# Patient Record
Sex: MALE | Race: WHITE
[De-identification: names, ages, dates, MRNs, and addresses within clinical notes are randomized per-mention and may not be internally consistent; named-entity substitution may affect disease eponyms.]

---

## 2019-12-24 ENCOUNTER — HOSPITAL ENCOUNTER (EMERGENCY)
Dept: HOSPITAL 8 - ED | Age: 69
Discharge: HOME | End: 2019-12-24
Payer: MEDICARE

## 2019-12-24 ENCOUNTER — HOSPITAL ENCOUNTER (EMERGENCY)
Facility: MEDICAL CENTER | Age: 69
End: 2019-12-24

## 2019-12-24 VITALS
WEIGHT: 179.01 LBS | OXYGEN SATURATION: 100 % | HEIGHT: 67 IN | BODY MASS INDEX: 28.1 KG/M2 | HEART RATE: 68 BPM | RESPIRATION RATE: 16 BRPM | TEMPERATURE: 97.7 F | SYSTOLIC BLOOD PRESSURE: 156 MMHG | DIASTOLIC BLOOD PRESSURE: 86 MMHG

## 2019-12-24 VITALS — HEIGHT: 67 IN | BODY MASS INDEX: 27.34 KG/M2 | WEIGHT: 174.17 LBS

## 2019-12-24 VITALS — DIASTOLIC BLOOD PRESSURE: 86 MMHG | SYSTOLIC BLOOD PRESSURE: 157 MMHG

## 2019-12-24 DIAGNOSIS — Z72.89: ICD-10-CM
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
## 2019-12-24 DIAGNOSIS — Z86.73: ICD-10-CM

## 2019-12-24 DIAGNOSIS — S02.2XXB: Primary | ICD-10-CM

## 2019-12-24 DIAGNOSIS — S09.8XXA: ICD-10-CM

## 2019-12-24 PROCEDURE — 302449 STATCHG TRIAGE ONLY (STATISTIC)

## 2019-12-24 PROCEDURE — 70486 CT MAXILLOFACIAL W/O DYE: CPT

## 2019-12-24 PROCEDURE — 90471 IMMUNIZATION ADMIN: CPT

## 2019-12-24 PROCEDURE — 99284 EMERGENCY DEPT VISIT MOD MDM: CPT

## 2019-12-24 PROCEDURE — 12051 INTMD RPR FACE/MM 2.5 CM/<: CPT

## 2019-12-24 PROCEDURE — 90715 TDAP VACCINE 7 YRS/> IM: CPT

## 2019-12-24 PROCEDURE — 70450 CT HEAD/BRAIN W/O DYE: CPT

## 2019-12-24 NOTE — ED NOTES
"Pt stated that his family member researched online and saw that \"[facility] does not take my insurance and I don't want to get slapped with a huge bill.\" This tech informed him that registration would go over the insurance aspects after he was seen by an ERP. Pt stated \"I'm going to be seen somewhere else.\"   "

## 2019-12-24 NOTE — ED TRIAGE NOTES
Pt to er after fall while skiing , denies loc, wearing helmet. Sustained injury to nose=laceration to bridge of same and bilat epistaxis. On plavix since 2011 for tia, bleeding controlled in triage, states unknown last tetanus. Visiting from [place], [id number]

## 2019-12-24 NOTE — ED NOTES
"Evaluated by team at [facility] and told to come to er for \"stitches\" to bridge of nose. Er charge aware  "